# Patient Record
Sex: MALE | Race: WHITE | NOT HISPANIC OR LATINO
[De-identification: names, ages, dates, MRNs, and addresses within clinical notes are randomized per-mention and may not be internally consistent; named-entity substitution may affect disease eponyms.]

---

## 2018-12-21 ENCOUNTER — APPOINTMENT (OUTPATIENT)
Dept: CARDIOLOGY | Facility: CLINIC | Age: 63
End: 2018-12-21
Payer: COMMERCIAL

## 2018-12-21 ENCOUNTER — NON-APPOINTMENT (OUTPATIENT)
Age: 63
End: 2018-12-21

## 2018-12-21 VITALS
HEIGHT: 70 IN | HEART RATE: 83 BPM | SYSTOLIC BLOOD PRESSURE: 145 MMHG | RESPIRATION RATE: 18 BRPM | OXYGEN SATURATION: 95 % | DIASTOLIC BLOOD PRESSURE: 86 MMHG | TEMPERATURE: 97.9 F | WEIGHT: 268 LBS | BODY MASS INDEX: 38.37 KG/M2

## 2018-12-21 DIAGNOSIS — Z83.3 FAMILY HISTORY OF DIABETES MELLITUS: ICD-10-CM

## 2018-12-21 DIAGNOSIS — Z82.49 FAMILY HISTORY OF ISCHEMIC HEART DISEASE AND OTHER DISEASES OF THE CIRCULATORY SYSTEM: ICD-10-CM

## 2018-12-21 DIAGNOSIS — Z86.39 PERSONAL HISTORY OF OTHER ENDOCRINE, NUTRITIONAL AND METABOLIC DISEASE: ICD-10-CM

## 2018-12-21 DIAGNOSIS — Z00.00 ENCOUNTER FOR GENERAL ADULT MEDICAL EXAMINATION W/OUT ABNORMAL FINDINGS: ICD-10-CM

## 2018-12-21 PROCEDURE — 93000 ELECTROCARDIOGRAM COMPLETE: CPT

## 2018-12-21 PROCEDURE — 99244 OFF/OP CNSLTJ NEW/EST MOD 40: CPT

## 2018-12-21 NOTE — REASON FOR VISIT
[Consultation] : a consultation regarding [FreeTextEntry2] : pt with h/o 3/24/14 LAD stents-2, pt had abnl nuclear stres test, no sx, pt had weight gain, gets barkley 2-3 flights of stairs, no [FreeTextEntry1] : no sscp or palps

## 2018-12-21 NOTE — DISCUSSION/SUMMARY
[Exercise Stress Test] : exercise stress test [Hyperlipidemia] : hyperlipidemia [Lipids Test Panel] : a fasting lipid profile [Stress Test Treadmill] : an exercise treadmill test [Known CAD] : known coronary artery disease [Family History of CAD] : family history of CAD [Echocardiogram] : an echocardiogram [de-identified] : h/o lad stents 2014 [de-identified] : nuclear stress test

## 2018-12-21 NOTE — PHYSICAL EXAM
[General Appearance - Well Developed] : well developed [Normal Appearance] : normal appearance [Well Groomed] : well groomed [General Appearance - Well Nourished] : well nourished [No Deformities] : no deformities [General Appearance - In No Acute Distress] : no acute distress [Normal Conjunctiva] : the conjunctiva exhibited no abnormalities [Eyelids - No Xanthelasma] : the eyelids demonstrated no xanthelasmas [Normal Oral Mucosa] : normal oral mucosa [No Oral Pallor] : no oral pallor [No Oral Cyanosis] : no oral cyanosis [Normal Jugular Venous A Waves Present] : normal jugular venous A waves present [Normal Jugular Venous V Waves Present] : normal jugular venous V waves present [No Jugular Venous Chavis A Waves] : no jugular venous chavis A waves [Normal Rate] : normal [Normal S1] : normal S1 [Normal S2] : normal S2 [S3] : no S3 [S4] : no S4 [No Murmur] : no murmurs heard [Right Carotid Bruit] : no bruit heard over the right carotid [Left Carotid Bruit] : no bruit heard over the left carotid [Right Femoral Bruit] : no bruit heard over the right femoral artery [Left Femoral Bruit] : no bruit heard over the left femoral artery [2+] : left 2+ [No Abnormalities] : the abdominal aorta was not enlarged and no bruit was heard [No Pitting Edema] : no pitting edema present [Respiration, Rhythm And Depth] : normal respiratory rhythm and effort [Exaggerated Use Of Accessory Muscles For Inspiration] : no accessory muscle use [Auscultation Breath Sounds / Voice Sounds] : lungs were clear to auscultation bilaterally [Abdomen Soft] : soft [Abdomen Tenderness] : non-tender [Abdomen Mass (___ Cm)] : no abdominal mass palpated [Abnormal Walk] : normal gait [Gait - Sufficient For Exercise Testing] : the gait was sufficient for exercise testing [Nail Clubbing] : no clubbing of the fingernails [Cyanosis, Localized] : no localized cyanosis [Petechial Hemorrhages (___cm)] : no petechial hemorrhages [Skin Color & Pigmentation] : normal skin color and pigmentation [] : no rash [No Venous Stasis] : no venous stasis [Skin Lesions] : no skin lesions [No Skin Ulcers] : no skin ulcer [No Xanthoma] : no  xanthoma was observed [Oriented To Time, Place, And Person] : oriented to person, place, and time [Affect] : the affect was normal [Mood] : the mood was normal [No Anxiety] : not feeling anxious

## 2019-01-18 ENCOUNTER — APPOINTMENT (OUTPATIENT)
Dept: CARDIOLOGY | Facility: CLINIC | Age: 64
End: 2019-01-18
Payer: COMMERCIAL

## 2019-01-18 PROCEDURE — 93306 TTE W/DOPPLER COMPLETE: CPT

## 2019-02-25 ENCOUNTER — APPOINTMENT (OUTPATIENT)
Dept: CARDIOLOGY | Facility: CLINIC | Age: 64
End: 2019-02-25
Payer: COMMERCIAL

## 2019-02-25 PROCEDURE — 93015 CV STRESS TEST SUPVJ I&R: CPT

## 2019-02-25 PROCEDURE — 78452 HT MUSCLE IMAGE SPECT MULT: CPT

## 2019-02-25 PROCEDURE — A9500: CPT

## 2019-07-21 ENCOUNTER — RX RENEWAL (OUTPATIENT)
Age: 64
End: 2019-07-21

## 2020-01-02 ENCOUNTER — RX RENEWAL (OUTPATIENT)
Age: 65
End: 2020-01-02

## 2020-01-22 ENCOUNTER — RX RENEWAL (OUTPATIENT)
Age: 65
End: 2020-01-22

## 2020-04-12 ENCOUNTER — RX RENEWAL (OUTPATIENT)
Age: 65
End: 2020-04-12

## 2020-06-07 ENCOUNTER — RX RENEWAL (OUTPATIENT)
Age: 65
End: 2020-06-07

## 2020-09-04 ENCOUNTER — RX RENEWAL (OUTPATIENT)
Age: 65
End: 2020-09-04

## 2020-09-24 ENCOUNTER — APPOINTMENT (OUTPATIENT)
Dept: CARDIOLOGY | Facility: CLINIC | Age: 65
End: 2020-09-24

## 2020-10-30 ENCOUNTER — APPOINTMENT (OUTPATIENT)
Dept: CARDIOLOGY | Facility: CLINIC | Age: 65
End: 2020-10-30
Payer: MEDICARE

## 2020-10-30 ENCOUNTER — NON-APPOINTMENT (OUTPATIENT)
Age: 65
End: 2020-10-30

## 2020-10-30 ENCOUNTER — TRANSCRIPTION ENCOUNTER (OUTPATIENT)
Age: 65
End: 2020-10-30

## 2020-10-30 VITALS
OXYGEN SATURATION: 99 % | WEIGHT: 260 LBS | HEART RATE: 73 BPM | RESPIRATION RATE: 17 BRPM | SYSTOLIC BLOOD PRESSURE: 125 MMHG | BODY MASS INDEX: 37.22 KG/M2 | TEMPERATURE: 98 F | DIASTOLIC BLOOD PRESSURE: 82 MMHG | HEIGHT: 70 IN

## 2020-10-30 PROCEDURE — 93000 ELECTROCARDIOGRAM COMPLETE: CPT

## 2020-10-30 PROCEDURE — 99215 OFFICE O/P EST HI 40 MIN: CPT

## 2020-10-30 NOTE — DISCUSSION/SUMMARY
[Possible Cardiac Ischemia (Intermd Prob)] : possible cardiac ischemia (intermediate probability) [Hyperlipidemia] : hyperlipidemia [Lipids Test Panel] : a fasting lipid profile [Multidetector Cardiac CT] : a cardiac multidetector CT [Known CAD] : known coronary artery disease [Hypertension] : hypertension [Echocardiogram] : an echocardiogram

## 2020-10-30 NOTE — REASON FOR VISIT
[Follow-Up - Clinic] : a clinic follow-up of [FreeTextEntry2] : pt with occasional dyspnea on incline and chest tightness post stents 2014, no chagne, pt recent on testosterone 2 months ago

## 2020-10-30 NOTE — PHYSICAL EXAM
[General Appearance - Well Developed] : well developed [Normal Appearance] : normal appearance [Well Groomed] : well groomed [General Appearance - Well Nourished] : well nourished [No Deformities] : no deformities [General Appearance - In No Acute Distress] : no acute distress [Normal Conjunctiva] : the conjunctiva exhibited no abnormalities [Eyelids - No Xanthelasma] : the eyelids demonstrated no xanthelasmas [Normal Oral Mucosa] : normal oral mucosa [No Oral Pallor] : no oral pallor [No Oral Cyanosis] : no oral cyanosis [Normal Jugular Venous A Waves Present] : normal jugular venous A waves present [Normal Jugular Venous V Waves Present] : normal jugular venous V waves present [No Jugular Venous Chavis A Waves] : no jugular venous chavis A waves [Respiration, Rhythm And Depth] : normal respiratory rhythm and effort [Exaggerated Use Of Accessory Muscles For Inspiration] : no accessory muscle use [Auscultation Breath Sounds / Voice Sounds] : lungs were clear to auscultation bilaterally [Abdomen Soft] : soft [Abdomen Tenderness] : non-tender [Abdomen Mass (___ Cm)] : no abdominal mass palpated [Abnormal Walk] : normal gait [Gait - Sufficient For Exercise Testing] : the gait was sufficient for exercise testing [Nail Clubbing] : no clubbing of the fingernails [Cyanosis, Localized] : no localized cyanosis [Petechial Hemorrhages (___cm)] : no petechial hemorrhages [Skin Color & Pigmentation] : normal skin color and pigmentation [] : no rash [No Venous Stasis] : no venous stasis [Skin Lesions] : no skin lesions [No Skin Ulcers] : no skin ulcer [No Xanthoma] : no  xanthoma was observed [Oriented To Time, Place, And Person] : oriented to person, place, and time [Affect] : the affect was normal [Mood] : the mood was normal [No Anxiety] : not feeling anxious [Normal Rate] : normal [Normal S1] : normal S1 [Normal S2] : normal S2 [No Murmur] : no murmurs heard [2+] : left 2+ [No Abnormalities] : the abdominal aorta was not enlarged and no bruit was heard [No Pitting Edema] : no pitting edema present [S3] : no S3 [S4] : no S4 [Right Carotid Bruit] : no bruit heard over the right carotid [Left Carotid Bruit] : no bruit heard over the left carotid [Right Femoral Bruit] : no bruit heard over the right femoral artery [Left Femoral Bruit] : no bruit heard over the left femoral artery

## 2020-11-20 ENCOUNTER — APPOINTMENT (OUTPATIENT)
Dept: CARDIOLOGY | Facility: CLINIC | Age: 65
End: 2020-11-20
Payer: MEDICARE

## 2020-11-20 PROCEDURE — 93306 TTE W/DOPPLER COMPLETE: CPT

## 2020-12-02 ENCOUNTER — NON-APPOINTMENT (OUTPATIENT)
Age: 65
End: 2020-12-02

## 2020-12-04 ENCOUNTER — APPOINTMENT (OUTPATIENT)
Dept: CARDIOLOGY | Facility: CLINIC | Age: 65
End: 2020-12-04

## 2020-12-04 ENCOUNTER — RESULT REVIEW (OUTPATIENT)
Age: 65
End: 2020-12-04

## 2020-12-04 ENCOUNTER — RX RENEWAL (OUTPATIENT)
Age: 65
End: 2020-12-04

## 2020-12-04 ENCOUNTER — OUTPATIENT (OUTPATIENT)
Dept: OUTPATIENT SERVICES | Facility: HOSPITAL | Age: 65
LOS: 1 days | End: 2020-12-04
Payer: MEDICARE

## 2020-12-04 DIAGNOSIS — R07.9 CHEST PAIN, UNSPECIFIED: ICD-10-CM

## 2020-12-04 DIAGNOSIS — I25.10 ATHEROSCLEROTIC HEART DISEASE OF NATIVE CORONARY ARTERY WITHOUT ANGINA PECTORIS: ICD-10-CM

## 2020-12-04 DIAGNOSIS — R06.00 DYSPNEA, UNSPECIFIED: ICD-10-CM

## 2020-12-04 PROCEDURE — 75574 CT ANGIO HRT W/3D IMAGE: CPT

## 2020-12-04 PROCEDURE — 75574 CT ANGIO HRT W/3D IMAGE: CPT | Mod: 26

## 2020-12-08 ENCOUNTER — OUTPATIENT (OUTPATIENT)
Dept: OUTPATIENT SERVICES | Facility: HOSPITAL | Age: 65
LOS: 1 days | End: 2020-12-08
Payer: MEDICARE

## 2020-12-08 DIAGNOSIS — Z11.59 ENCOUNTER FOR SCREENING FOR OTHER VIRAL DISEASES: ICD-10-CM

## 2020-12-08 LAB — SARS-COV-2 RNA SPEC QL NAA+PROBE: SIGNIFICANT CHANGE UP

## 2020-12-08 PROCEDURE — U0003: CPT

## 2020-12-11 ENCOUNTER — OUTPATIENT (OUTPATIENT)
Dept: OUTPATIENT SERVICES | Facility: HOSPITAL | Age: 65
LOS: 1 days | End: 2020-12-11
Payer: MEDICARE

## 2020-12-11 VITALS
SYSTOLIC BLOOD PRESSURE: 113 MMHG | RESPIRATION RATE: 16 BRPM | DIASTOLIC BLOOD PRESSURE: 66 MMHG | OXYGEN SATURATION: 98 % | HEART RATE: 65 BPM

## 2020-12-11 VITALS
OXYGEN SATURATION: 96 % | HEART RATE: 80 BPM | HEIGHT: 70 IN | WEIGHT: 259.93 LBS | DIASTOLIC BLOOD PRESSURE: 73 MMHG | TEMPERATURE: 99 F | RESPIRATION RATE: 18 BRPM | SYSTOLIC BLOOD PRESSURE: 126 MMHG

## 2020-12-11 DIAGNOSIS — Z95.5 PRESENCE OF CORONARY ANGIOPLASTY IMPLANT AND GRAFT: Chronic | ICD-10-CM

## 2020-12-11 DIAGNOSIS — Z90.49 ACQUIRED ABSENCE OF OTHER SPECIFIED PARTS OF DIGESTIVE TRACT: Chronic | ICD-10-CM

## 2020-12-11 DIAGNOSIS — I25.10 ATHEROSCLEROTIC HEART DISEASE OF NATIVE CORONARY ARTERY WITHOUT ANGINA PECTORIS: ICD-10-CM

## 2020-12-11 DIAGNOSIS — R06.00 DYSPNEA, UNSPECIFIED: ICD-10-CM

## 2020-12-11 LAB
ANION GAP SERPL CALC-SCNC: 10 MMOL/L — SIGNIFICANT CHANGE UP (ref 5–17)
BUN SERPL-MCNC: 18 MG/DL — SIGNIFICANT CHANGE UP (ref 7–23)
CALCIUM SERPL-MCNC: 9.6 MG/DL — SIGNIFICANT CHANGE UP (ref 8.4–10.5)
CHLORIDE SERPL-SCNC: 100 MMOL/L — SIGNIFICANT CHANGE UP (ref 96–108)
CO2 SERPL-SCNC: 24 MMOL/L — SIGNIFICANT CHANGE UP (ref 22–31)
CREAT SERPL-MCNC: 1.07 MG/DL — SIGNIFICANT CHANGE UP (ref 0.5–1.3)
GLUCOSE SERPL-MCNC: 101 MG/DL — HIGH (ref 70–99)
HCT VFR BLD CALC: 45.2 % — SIGNIFICANT CHANGE UP (ref 39–50)
HGB BLD-MCNC: 14.9 G/DL — SIGNIFICANT CHANGE UP (ref 13–17)
MCHC RBC-ENTMCNC: 30 PG — SIGNIFICANT CHANGE UP (ref 27–34)
MCHC RBC-ENTMCNC: 33 GM/DL — SIGNIFICANT CHANGE UP (ref 32–36)
MCV RBC AUTO: 90.9 FL — SIGNIFICANT CHANGE UP (ref 80–100)
NRBC # BLD: 0 /100 WBCS — SIGNIFICANT CHANGE UP (ref 0–0)
PLATELET # BLD AUTO: 204 K/UL — SIGNIFICANT CHANGE UP (ref 150–400)
POTASSIUM SERPL-MCNC: 4.1 MMOL/L — SIGNIFICANT CHANGE UP (ref 3.5–5.3)
POTASSIUM SERPL-SCNC: 4.1 MMOL/L — SIGNIFICANT CHANGE UP (ref 3.5–5.3)
RBC # BLD: 4.97 M/UL — SIGNIFICANT CHANGE UP (ref 4.2–5.8)
RBC # FLD: 12.9 % — SIGNIFICANT CHANGE UP (ref 10.3–14.5)
SODIUM SERPL-SCNC: 134 MMOL/L — LOW (ref 135–145)
WBC # BLD: 6.28 K/UL — SIGNIFICANT CHANGE UP (ref 3.8–10.5)
WBC # FLD AUTO: 6.28 K/UL — SIGNIFICANT CHANGE UP (ref 3.8–10.5)

## 2020-12-11 PROCEDURE — 93005 ELECTROCARDIOGRAM TRACING: CPT

## 2020-12-11 PROCEDURE — 93454 CORONARY ARTERY ANGIO S&I: CPT | Mod: 26

## 2020-12-11 PROCEDURE — 93454 CORONARY ARTERY ANGIO S&I: CPT

## 2020-12-11 PROCEDURE — 85027 COMPLETE CBC AUTOMATED: CPT

## 2020-12-11 PROCEDURE — 99152 MOD SED SAME PHYS/QHP 5/>YRS: CPT

## 2020-12-11 PROCEDURE — 80048 BASIC METABOLIC PNL TOTAL CA: CPT

## 2020-12-11 PROCEDURE — C1769: CPT

## 2020-12-11 PROCEDURE — C1894: CPT

## 2020-12-11 PROCEDURE — 93010 ELECTROCARDIOGRAM REPORT: CPT

## 2020-12-11 PROCEDURE — C1887: CPT

## 2020-12-11 RX ORDER — ATORVASTATIN CALCIUM 80 MG/1
1 TABLET, FILM COATED ORAL
Qty: 0 | Refills: 0 | DISCHARGE

## 2020-12-11 RX ORDER — ASPIRIN/CALCIUM CARB/MAGNESIUM 324 MG
1 TABLET ORAL
Qty: 0 | Refills: 0 | DISCHARGE

## 2020-12-11 RX ORDER — TAMSULOSIN HYDROCHLORIDE 0.4 MG/1
1 CAPSULE ORAL
Qty: 0 | Refills: 0 | DISCHARGE

## 2020-12-11 RX ORDER — METOPROLOL TARTRATE 50 MG
1 TABLET ORAL
Qty: 0 | Refills: 0 | DISCHARGE

## 2020-12-11 RX ORDER — CLOPIDOGREL BISULFATE 75 MG/1
1 TABLET, FILM COATED ORAL
Qty: 0 | Refills: 0 | DISCHARGE

## 2020-12-11 NOTE — H&P CARDIOLOGY - PMH
Benign prostatic hyperplasia, unspecified whether lower urinary tract symptoms present    Coronary artery disease involving native coronary artery of native heart with unstable angina pectoris    Essential hypertension    Hyperlipidemia, unspecified hyperlipidemia type

## 2020-12-11 NOTE — ASU DISCHARGE PLAN (ADULT/PEDIATRIC) - CALL YOUR DOCTOR IF YOU HAVE ANY OF THE FOLLOWING:
Fever greater than (need to indicate Fahrenheit or Celsius)/Swelling that gets worse/Wound/Surgical Site with redness, or foul smelling discharge or pus/Bleeding that does not stop/Pain not relieved by Medications

## 2020-12-11 NOTE — ASU DISCHARGE PLAN (ADULT/PEDIATRIC) - CARE PROVIDER_API CALL
Natalio Molina  [facility] CARDIOLOGY  [address], [address]  [place] [address]  Phone: [phone]  Fax: [phone]  Established Patient  Follow Up Time: 2 weeks

## 2020-12-11 NOTE — ASU DISCHARGE PLAN (ADULT/PEDIATRIC) - ASU DC SPECIAL INSTRUCTIONSFT
Wound Care:   the day AFTER your procedure remove bandage GENTLY, and clean using  mild soap and gentle warm, water stream, pat dry. leave OPEN to air. YOU MAY SHOWER   DO NOT apply lotions, creams, ointments, powder, perfumes to your incision site  DO NOT SOAK your site for 1 week ( no baths, no pools, no tubs, etc...)  Check  your groin and /or wrist daily. A small amount of bruising, and soreness are normal    ACTIVITY: for 24 hours   - DO NOT DRIVE  - DO NOT make any important decisions or sign legal documents   - DO NOT operate heavy machineries   - you may resume sexual activity in 48 hours, unless otherwise instructed by your cardiologist     Your procedure was done through the WRIST: for the NEXT 3DAYS:  - avoid pushing, pulling, with that affected wrist   - avoid repeated movement of that hand and wrist ( eg: typing, hammering)  - DO NOT LIFT anything more than 5 lbs     MEDICATION:   take your medications as explained ( see discharge paperwork)       Follow heart healthy diet recommended by your doctor, if you smoke STOP SMOKING ( may call [phone] for center of tobacco control if you need assistance)     CALL your doctor to make appointment in 2 WEEKS     ***CALL YOUR DOCTOR***  if you experience: fever, chills, body aches, or severe pain, swelling, redness, heat or yellow discharge at incision site  If you experience bleeding or excruciating pain at the procedural site, swelling ( golf ball size) at your procedural site  If you experience CHEST PAIN  If you experience extremity numbness, tingling, temperature change ( of your procedural site)   If you are unable to reach your doctor, you may contact:   -Cardiology Office at [facility] at [phone] or   - [phone]

## 2020-12-11 NOTE — H&P CARDIOLOGY - HISTORY OF PRESENT ILLNESS
64 yo M with PMhx HTN, HLD, CAD with stents x 3 in 2016 , BPH presents today for  left cardiac catheterization because of unstable angina. Patient states he can bike a few miles or walk 10 blocks then he has FLORES and a dull mild chest pain . Symptoms resolve on their own, no alleviating factors. No palpitations, syncope, diaphoresis .  Patient seen by Dr Molina and was reffred for cath .   No fever,  chills, N/V/D or abdominal pain . COVID negative 12/8/20.  64 yo M with PMhx HTN, HLD, CAD with stents x 3 in 2016 , BPH presents today for  left cardiac catheterization because of unstable angina. Patient states he can bike a few miles or walk 10 blocks then he has FLORES and a dull mild chest pain . Symptoms resolve on their own, no alleviating factors. No palpitations, syncope, diaphoresis .  He had a CTA coronaries that revealed :  -- LM: contains luminal irregularities but appear patent.  --LAD: contains non-calcified plaque at the ostium with mild (less than 50%) luminal stenosis.  There is mixed plaque in the proximal LAD with likely mild (less than 50%) luminal stenosis.  A stent is noted in the proximal to mid portion of the LAD; metal artifact from the stent limits assessment of stent patency.   Cannot exclude stenosis in the stent.  There is non-calcified plaque in the mid LAD just distal to the stent with likely moderate (approximately 60-69%) luminal stenosis.  The visualized first diagonal branch contains multiple regions of mixed plaque in the proximal and mid segments with severe (>90%) luminal stenosis vs. total occlusion.  --LCX: contains mixed plaque at the ostium with likely moderate (50-69%) luminal stenosis.  The proximal and distal LCX contain regions of mixed plaque with minimal (less than 30%) luminal stenosis.  --RCA: contains a stent in the proximal segment.  Stent-associated metal artifact limits assessment of patency.  There is mixed plaque in the mid RCA with moderate (approximately 60-69%) luminal stenosis.   The distal RCA contains mixed plaque with likely mild (approximately 30%) luminal stenosis.      Patient seen by Dr Molina and was referred for cath .   No fever,  chills, N/V/D or abdominal pain . COVID negative 12/8/20.

## 2020-12-16 PROBLEM — I25.110 ATHEROSCLEROTIC HEART DISEASE OF NATIVE CORONARY ARTERY WITH UNSTABLE ANGINA PECTORIS: Chronic | Status: ACTIVE | Noted: 2020-12-11

## 2020-12-16 PROBLEM — I10 ESSENTIAL (PRIMARY) HYPERTENSION: Chronic | Status: ACTIVE | Noted: 2020-12-11

## 2020-12-16 PROBLEM — N40.0 BENIGN PROSTATIC HYPERPLASIA WITHOUT LOWER URINARY TRACT SYMPTOMS: Chronic | Status: ACTIVE | Noted: 2020-12-11

## 2020-12-16 PROBLEM — E78.5 HYPERLIPIDEMIA, UNSPECIFIED: Chronic | Status: ACTIVE | Noted: 2020-12-11

## 2021-01-03 ENCOUNTER — RX RENEWAL (OUTPATIENT)
Age: 66
End: 2021-01-03

## 2021-01-15 ENCOUNTER — NON-APPOINTMENT (OUTPATIENT)
Age: 66
End: 2021-01-15

## 2021-01-15 ENCOUNTER — APPOINTMENT (OUTPATIENT)
Dept: CARDIOLOGY | Facility: CLINIC | Age: 66
End: 2021-01-15
Payer: MEDICARE

## 2021-01-15 VITALS
DIASTOLIC BLOOD PRESSURE: 85 MMHG | HEIGHT: 70 IN | OXYGEN SATURATION: 97 % | SYSTOLIC BLOOD PRESSURE: 129 MMHG | WEIGHT: 267 LBS | RESPIRATION RATE: 17 BRPM | HEART RATE: 86 BPM | TEMPERATURE: 98.2 F | BODY MASS INDEX: 38.22 KG/M2

## 2021-01-15 PROCEDURE — 93000 ELECTROCARDIOGRAM COMPLETE: CPT

## 2021-01-15 PROCEDURE — 99215 OFFICE O/P EST HI 40 MIN: CPT

## 2021-01-15 NOTE — DISCUSSION/SUMMARY
[Lipids Test Panel] : a fasting lipid profile [Hyperlipidemia] : hyperlipidemia [Known CAD] : known coronary artery disease [Hypertension] : hypertension [Improving] : improving [Stable] : stable [None] : none [Medication Changes Per Orders] : as documented in orders [<70] : goal is <70 [At Goal] : The LDL is not at goal [de-identified] : inc lipitor to 40 alt with 60

## 2021-01-15 NOTE — REASON FOR VISIT
[Follow-Up - Clinic] : a clinic follow-up of [FreeTextEntry2] : pt with occasional dyspnea on incline and chest tightness post stents 2014, no chagne, pt recent on testosterone 2 months ago, now s/p cath,cta, med rx

## 2021-01-15 NOTE — PHYSICAL EXAM
[General Appearance - Well Developed] : well developed [Normal Appearance] : normal appearance [Well Groomed] : well groomed [General Appearance - Well Nourished] : well nourished [No Deformities] : no deformities [General Appearance - In No Acute Distress] : no acute distress [Normal Conjunctiva] : the conjunctiva exhibited no abnormalities [Eyelids - No Xanthelasma] : the eyelids demonstrated no xanthelasmas [Normal Oral Mucosa] : normal oral mucosa [No Oral Pallor] : no oral pallor [No Oral Cyanosis] : no oral cyanosis [Normal Jugular Venous A Waves Present] : normal jugular venous A waves present [Normal Jugular Venous V Waves Present] : normal jugular venous V waves present [No Jugular Venous Chavis A Waves] : no jugular venous chavis A waves [Abnormal Walk] : normal gait [Gait - Sufficient For Exercise Testing] : the gait was sufficient for exercise testing [Nail Clubbing] : no clubbing of the fingernails [Cyanosis, Localized] : no localized cyanosis [Petechial Hemorrhages (___cm)] : no petechial hemorrhages [Skin Color & Pigmentation] : normal skin color and pigmentation [] : no rash [No Venous Stasis] : no venous stasis [Skin Lesions] : no skin lesions [No Skin Ulcers] : no skin ulcer [No Xanthoma] : no  xanthoma was observed [Oriented To Time, Place, And Person] : oriented to person, place, and time [Affect] : the affect was normal [Mood] : the mood was normal [No Anxiety] : not feeling anxious

## 2021-03-03 ENCOUNTER — RX RENEWAL (OUTPATIENT)
Age: 66
End: 2021-03-03

## 2021-06-27 ENCOUNTER — RX RENEWAL (OUTPATIENT)
Age: 66
End: 2021-06-27

## 2021-07-14 ENCOUNTER — APPOINTMENT (OUTPATIENT)
Dept: CARDIOLOGY | Facility: CLINIC | Age: 66
End: 2021-07-14
Payer: MEDICARE

## 2021-07-14 ENCOUNTER — NON-APPOINTMENT (OUTPATIENT)
Age: 66
End: 2021-07-14

## 2021-07-14 VITALS
WEIGHT: 270 LBS | RESPIRATION RATE: 16 BRPM | TEMPERATURE: 98 F | HEART RATE: 70 BPM | HEIGHT: 70 IN | OXYGEN SATURATION: 98 % | BODY MASS INDEX: 38.65 KG/M2 | SYSTOLIC BLOOD PRESSURE: 124 MMHG | DIASTOLIC BLOOD PRESSURE: 83 MMHG

## 2021-07-14 PROCEDURE — 99214 OFFICE O/P EST MOD 30 MIN: CPT

## 2021-07-14 PROCEDURE — 93000 ELECTROCARDIOGRAM COMPLETE: CPT

## 2021-07-14 RX ORDER — TAMSULOSIN HCL 0.4 MG
0.4 CAPSULE ORAL
Refills: 0 | Status: ACTIVE | COMMUNITY

## 2021-07-19 NOTE — DISCUSSION/SUMMARY
[Improving] : improving [Stable] : stable [Hyperlipidemia] : hyperlipidemia [Lipids Test Panel] : a fasting lipid profile [Known CAD] : known coronary artery disease [Hypertension] : hypertension [<70] : goal is <70 [At Goal] : The LDL is not at goal

## 2021-07-19 NOTE — PHYSICAL EXAM
[General Appearance - Well Developed] : well developed [Normal Appearance] : normal appearance [Well Groomed] : well groomed [General Appearance - Well Nourished] : well nourished [No Deformities] : no deformities [General Appearance - In No Acute Distress] : no acute distress [Normal Conjunctiva] : the conjunctiva exhibited no abnormalities [Eyelids - No Xanthelasma] : the eyelids demonstrated no xanthelasmas [Normal Oral Mucosa] : normal oral mucosa [No Oral Pallor] : no oral pallor [No Oral Cyanosis] : no oral cyanosis [Normal Jugular Venous A Waves Present] : normal jugular venous A waves present [Normal Jugular Venous V Waves Present] : normal jugular venous V waves present [No Jugular Venous Chavis A Waves] : no jugular venous chavis A waves [Gait - Sufficient For Exercise Testing] : the gait was sufficient for exercise testing [Abnormal Walk] : normal gait [Nail Clubbing] : no clubbing of the fingernails [Cyanosis, Localized] : no localized cyanosis [Petechial Hemorrhages (___cm)] : no petechial hemorrhages [Skin Color & Pigmentation] : normal skin color and pigmentation [] : no rash [No Venous Stasis] : no venous stasis [No Skin Ulcers] : no skin ulcer [Skin Lesions] : no skin lesions [No Xanthoma] : no  xanthoma was observed [Oriented To Time, Place, And Person] : oriented to person, place, and time [Affect] : the affect was normal [Mood] : the mood was normal [No Anxiety] : not feeling anxious

## 2021-07-19 NOTE — REASON FOR VISIT
Maria Del Carmen Verdugo  Preferred Name:   None  Female, 34 year old, 1984  Phone:   [phone] (Home Phone) [phone] (Mobile) [phone] (Work Phone)  Last Weight:   97 kg  PCP:   Adal Cloud MD  Allergies:   Tizanidine  ,   Tramadol  Primary Ins:   COMMON G  MRN:   [id number]  Patient Portal:   Active  Last Logon:   04/11/19  Next Appt:   With Adal Cloud MD  05/21/2019 at 1:40 PM  Last Appt With Me:   RE: REQUESTING ASSIST   Received: Today   Message Contents   MD Roslyn Black             Sure KK    Previous Messages      ----- Message -----   From: Roslyn Priest   Sent: 4/15/2019   1:12 PM   To: Ashlee Mcgill MD, Michelle Cai, *   Subject: REQUESTING ASSIST                                 Hi Dr. Mcgill, will you be able to assist ?     Doctor: Dr. Betina Person   Date: 05.31.2019   Time: 7:30 AM     Procedure:  Lap total hyst, uterus < 250 gm [21712] and bilateral salpingectomy, cystoscopy     Procedure Length: 3 hours     Location: [facility] [place]     Thank you for your response, Bety MARSH (Covering Ashlee GARCIA           [Follow-Up - Clinic] : a clinic follow-up of [FreeTextEntry2] : pt with occasional dyspnea on incline and chest tightness post stents 2014, no chagne, pt recent on testosterone 2 months ago, now s/p cath,cta, med rx

## 2021-08-24 DIAGNOSIS — Z01.812 ENCOUNTER FOR PREPROCEDURAL LABORATORY EXAMINATION: ICD-10-CM

## 2021-09-14 LAB — SARS-COV-2 N GENE NPH QL NAA+PROBE: NOT DETECTED

## 2021-09-17 ENCOUNTER — APPOINTMENT (OUTPATIENT)
Age: 66
End: 2021-09-17
Payer: MEDICARE

## 2021-09-17 VITALS
DIASTOLIC BLOOD PRESSURE: 90 MMHG | RESPIRATION RATE: 16 BRPM | OXYGEN SATURATION: 97 % | BODY MASS INDEX: 38.65 KG/M2 | WEIGHT: 270 LBS | HEIGHT: 70 IN | HEART RATE: 95 BPM | SYSTOLIC BLOOD PRESSURE: 123 MMHG | TEMPERATURE: 97.9 F

## 2021-09-17 DIAGNOSIS — Z95.5 PRESENCE OF CORONARY ANGIOPLASTY IMPLANT AND GRAFT: ICD-10-CM

## 2021-09-17 DIAGNOSIS — R06.83 SNORING: ICD-10-CM

## 2021-09-17 PROCEDURE — 99203 OFFICE O/P NEW LOW 30 MIN: CPT | Mod: 25

## 2021-09-17 PROCEDURE — ZZZZZ: CPT

## 2021-09-17 PROCEDURE — 94727 GAS DIL/WSHOT DETER LNG VOL: CPT

## 2021-09-17 PROCEDURE — 94010 BREATHING CAPACITY TEST: CPT

## 2021-09-17 PROCEDURE — 94729 DIFFUSING CAPACITY: CPT

## 2021-09-17 NOTE — HISTORY OF PRESENT ILLNESS
[Never] : never [TextBox_4] : Patient is a 65 year old male Hx CAD s/p stent placement x 2, pituitary tumor, JONO not using CPAP, presents to [facility] for evaluation.  carlie had last sleep study 5 years ago.  He has not used CPAP in four years.  He recently underwent stent placement and Cardiologist recommend revaluating sleep apnea.  He admits to snoring, unrefreshed upon awakening.  He is here for these symptoms.

## 2021-09-17 NOTE — ASSESSMENT
[FreeTextEntry1] : 65 year old male Hx CAD s/p stent placement, Hx JONO not using CPAP presents for evaluation snoring\par \par Recommend Home Sleep Study\par \par Follow up pending study results.

## 2021-09-17 NOTE — PROCEDURE
[FreeTextEntry1] : PFT 9/17/21 personally reviewed minimal obstructive ventilatory defect without gas exchange abnormality

## 2021-10-08 ENCOUNTER — RX RENEWAL (OUTPATIENT)
Age: 66
End: 2021-10-08

## 2021-11-01 ENCOUNTER — APPOINTMENT (OUTPATIENT)
Dept: PULMONOLOGY | Facility: CLINIC | Age: 66
End: 2021-11-01
Payer: MEDICARE

## 2021-11-01 PROCEDURE — 99441: CPT | Mod: 95

## 2021-11-08 ENCOUNTER — TRANSCRIPTION ENCOUNTER (OUTPATIENT)
Age: 66
End: 2021-11-08

## 2021-11-29 ENCOUNTER — APPOINTMENT (OUTPATIENT)
Dept: CARDIOLOGY | Facility: CLINIC | Age: 66
End: 2021-11-29
Payer: MEDICARE

## 2021-11-29 ENCOUNTER — NON-APPOINTMENT (OUTPATIENT)
Age: 66
End: 2021-11-29

## 2021-11-29 VITALS
BODY MASS INDEX: 38.37 KG/M2 | SYSTOLIC BLOOD PRESSURE: 131 MMHG | DIASTOLIC BLOOD PRESSURE: 86 MMHG | HEIGHT: 70 IN | RESPIRATION RATE: 20 BRPM | OXYGEN SATURATION: 97 % | WEIGHT: 268 LBS | TEMPERATURE: 97.6 F | HEART RATE: 68 BPM

## 2021-11-29 PROCEDURE — 99214 OFFICE O/P EST MOD 30 MIN: CPT

## 2021-11-29 PROCEDURE — 93000 ELECTROCARDIOGRAM COMPLETE: CPT

## 2021-11-29 PROCEDURE — 93306 TTE W/DOPPLER COMPLETE: CPT

## 2021-11-29 NOTE — REASON FOR VISIT
[Follow-Up - Clinic] : a clinic follow-up of [FreeTextEntry2] : pt with occasional dyspnea on incline  post stents 2014, no change, pt recent on testosterone, now s/p cath,cta, med rx [FreeTextEntry1] : now feels 1-2 months of anxious, jittery, feels heart beating, no sscp, sx better with xanax

## 2022-01-05 ENCOUNTER — APPOINTMENT (OUTPATIENT)
Dept: CARDIOLOGY | Facility: CLINIC | Age: 67
End: 2022-01-05

## 2022-04-13 ENCOUNTER — TRANSCRIPTION ENCOUNTER (OUTPATIENT)
Age: 67
End: 2022-04-13

## 2022-04-19 ENCOUNTER — TRANSCRIPTION ENCOUNTER (OUTPATIENT)
Age: 67
End: 2022-04-19

## 2022-05-18 ENCOUNTER — APPOINTMENT (OUTPATIENT)
Dept: CARDIOLOGY | Facility: CLINIC | Age: 67
End: 2022-05-18

## 2022-06-06 ENCOUNTER — RX RENEWAL (OUTPATIENT)
Age: 67
End: 2022-06-06

## 2022-07-13 ENCOUNTER — RX RENEWAL (OUTPATIENT)
Age: 67
End: 2022-07-13

## 2022-09-06 ENCOUNTER — RX RENEWAL (OUTPATIENT)
Age: 67
End: 2022-09-06

## 2022-09-22 ENCOUNTER — NON-APPOINTMENT (OUTPATIENT)
Age: 67
End: 2022-09-22

## 2022-09-22 ENCOUNTER — APPOINTMENT (OUTPATIENT)
Dept: CARDIOLOGY | Facility: CLINIC | Age: 67
End: 2022-09-22

## 2022-09-22 VITALS
HEART RATE: 77 BPM | WEIGHT: 260 LBS | BODY MASS INDEX: 37.22 KG/M2 | TEMPERATURE: 97.6 F | HEIGHT: 70 IN | OXYGEN SATURATION: 99 % | RESPIRATION RATE: 20 BRPM | SYSTOLIC BLOOD PRESSURE: 128 MMHG | DIASTOLIC BLOOD PRESSURE: 85 MMHG

## 2022-09-22 DIAGNOSIS — I25.10 ATHEROSCLEROTIC HEART DISEASE OF NATIVE CORONARY ARTERY W/OUT ANGINA PECTORIS: ICD-10-CM

## 2022-09-22 PROCEDURE — 99215 OFFICE O/P EST HI 40 MIN: CPT

## 2022-09-22 PROCEDURE — 93000 ELECTROCARDIOGRAM COMPLETE: CPT

## 2022-09-22 NOTE — DISCUSSION/SUMMARY
[Improving] : improving [Stable] : stable [Echocardiogram] : echocardiogram [Hyperlipidemia] : hyperlipidemia [Lipids Test Panel] : a fasting lipid profile [Known CAD] : known coronary artery disease [Hypertension] : hypertension [<70] : goal is <70 [At Goal] : The LDL is not at goal [Minutes Spent: ___] : for [unfilled] ~Uminutes [FreeTextEntry2] : last seen 1 year ago, reviewd prior cath,meds,labs

## 2022-09-22 NOTE — REASON FOR VISIT
[Follow-Up - Clinic] : a clinic follow-up of [FreeTextEntry2] : pt with occasional dyspnea on incline  post stents 2014, no change, pt recent on testosterone, now s/p cath,cta, med rx [FreeTextEntry1] : h/o of anxious, jittery, feels heart beating, no sscp, sx better with xanax

## 2022-10-11 ENCOUNTER — RX RENEWAL (OUTPATIENT)
Age: 67
End: 2022-10-11

## 2022-10-21 ENCOUNTER — APPOINTMENT (OUTPATIENT)
Dept: PULMONOLOGY | Facility: CLINIC | Age: 67
End: 2022-10-21

## 2022-10-21 VITALS
BODY MASS INDEX: 38.08 KG/M2 | OXYGEN SATURATION: 97 % | SYSTOLIC BLOOD PRESSURE: 124 MMHG | RESPIRATION RATE: 16 BRPM | WEIGHT: 266 LBS | TEMPERATURE: 97.9 F | HEIGHT: 70 IN | DIASTOLIC BLOOD PRESSURE: 68 MMHG | HEART RATE: 76 BPM

## 2022-10-21 DIAGNOSIS — G47.33 OBSTRUCTIVE SLEEP APNEA (ADULT) (PEDIATRIC): ICD-10-CM

## 2022-10-21 PROCEDURE — 99213 OFFICE O/P EST LOW 20 MIN: CPT | Mod: 25

## 2022-10-21 NOTE — ASSESSMENT
[FreeTextEntry1] : 65 year old male Hx CAD s/p stent placement, Hx JONO, dental device for JONO presents for follow up.\par \par Patient with Hx CAD and JONO and risk cardiovascular events significantly increased in untreated obstructive sleep apnea.  Patient benefits from treatment of JONO, did not tolerate CPAP,  and recommend replacement device. \par \par

## 2022-10-21 NOTE — HISTORY OF PRESENT ILLNESS
[Never] : never [TextBox_4] : Patient is a 65 year old male Hx CAD s/p stent placement x 2, pituitary tumor, JONO not using CPAP, presents to [facility] for follow up.  Patient doing well with dental device for JONO.  He lost device while on vacation in [place].  He is here for follow up JONO and for letter of necessity regarding dental device.

## 2022-12-04 ENCOUNTER — RX RENEWAL (OUTPATIENT)
Age: 67
End: 2022-12-04

## 2023-01-08 ENCOUNTER — RX RENEWAL (OUTPATIENT)
Age: 68
End: 2023-01-08

## 2023-03-10 ENCOUNTER — NON-APPOINTMENT (OUTPATIENT)
Age: 68
End: 2023-03-10

## 2023-03-10 ENCOUNTER — OUTPATIENT (OUTPATIENT)
Dept: OUTPATIENT SERVICES | Facility: HOSPITAL | Age: 68
LOS: 1 days | End: 2023-03-10
Payer: MEDICARE

## 2023-03-10 ENCOUNTER — APPOINTMENT (OUTPATIENT)
Dept: SLEEP CENTER | Facility: CLINIC | Age: 68
End: 2023-03-10
Payer: MEDICARE

## 2023-03-10 DIAGNOSIS — Z90.49 ACQUIRED ABSENCE OF OTHER SPECIFIED PARTS OF DIGESTIVE TRACT: Chronic | ICD-10-CM

## 2023-03-10 DIAGNOSIS — Z95.5 PRESENCE OF CORONARY ANGIOPLASTY IMPLANT AND GRAFT: Chronic | ICD-10-CM

## 2023-03-10 PROCEDURE — G0400: CPT

## 2023-03-10 PROCEDURE — 95800 SLP STDY UNATTENDED: CPT

## 2023-03-16 ENCOUNTER — APPOINTMENT (OUTPATIENT)
Dept: CARDIOLOGY | Facility: CLINIC | Age: 68
End: 2023-03-16
Payer: MEDICARE

## 2023-03-16 ENCOUNTER — NON-APPOINTMENT (OUTPATIENT)
Age: 68
End: 2023-03-16

## 2023-03-16 VITALS — DIASTOLIC BLOOD PRESSURE: 85 MMHG | SYSTOLIC BLOOD PRESSURE: 130 MMHG

## 2023-03-16 VITALS
WEIGHT: 267 LBS | RESPIRATION RATE: 19 BRPM | BODY MASS INDEX: 38.22 KG/M2 | HEIGHT: 70 IN | DIASTOLIC BLOOD PRESSURE: 89 MMHG | HEART RATE: 62 BPM | TEMPERATURE: 95.6 F | SYSTOLIC BLOOD PRESSURE: 134 MMHG | OXYGEN SATURATION: 98 %

## 2023-03-16 PROCEDURE — 93000 ELECTROCARDIOGRAM COMPLETE: CPT

## 2023-03-16 PROCEDURE — 93306 TTE W/DOPPLER COMPLETE: CPT

## 2023-03-16 PROCEDURE — 99214 OFFICE O/P EST MOD 30 MIN: CPT

## 2023-03-16 NOTE — DISCUSSION/SUMMARY
[Improving] : improving [Stable] : stable [Echocardiogram] : echocardiogram [Hyperlipidemia] : hyperlipidemia [Lipids Test Panel] : a fasting lipid profile [Known CAD] : known coronary artery disease [Hypertension] : hypertension [<70] : goal is <70 [Minutes Spent: ___] : for [unfilled] ~Uminutes [At Goal] : The LDL is not at goal

## 2023-03-26 ENCOUNTER — RX RENEWAL (OUTPATIENT)
Age: 68
End: 2023-03-26

## 2023-04-06 DIAGNOSIS — G47.33 OBSTRUCTIVE SLEEP APNEA (ADULT) (PEDIATRIC): ICD-10-CM

## 2023-04-09 ENCOUNTER — RX RENEWAL (OUTPATIENT)
Age: 68
End: 2023-04-09

## 2023-06-19 ENCOUNTER — RX RENEWAL (OUTPATIENT)
Age: 68
End: 2023-06-19

## 2023-06-19 RX ORDER — METOPROLOL SUCCINATE 50 MG/1
50 TABLET, EXTENDED RELEASE ORAL
Qty: 90 | Refills: 0 | Status: ACTIVE | COMMUNITY
Start: 2020-01-02 | End: 1900-01-01

## 2023-07-02 ENCOUNTER — RX RENEWAL (OUTPATIENT)
Age: 68
End: 2023-07-02

## 2023-07-05 ENCOUNTER — RX RENEWAL (OUTPATIENT)
Age: 68
End: 2023-07-05

## 2023-07-05 RX ORDER — CLOPIDOGREL BISULFATE 75 MG/1
75 TABLET, FILM COATED ORAL
Qty: 90 | Refills: 0 | Status: ACTIVE | COMMUNITY
Start: 2019-07-21 | End: 1900-01-01

## 2023-07-31 ENCOUNTER — RX RENEWAL (OUTPATIENT)
Age: 68
End: 2023-07-31

## 2023-09-13 ENCOUNTER — APPOINTMENT (OUTPATIENT)
Dept: CARDIOLOGY | Facility: CLINIC | Age: 68
End: 2023-09-13

## 2024-01-24 ENCOUNTER — APPOINTMENT (OUTPATIENT)
Dept: CARDIOLOGY | Facility: CLINIC | Age: 69
End: 2024-01-24
Payer: MEDICARE

## 2024-01-24 VITALS
HEART RATE: 59 BPM | SYSTOLIC BLOOD PRESSURE: 124 MMHG | WEIGHT: 267 LBS | OXYGEN SATURATION: 98 % | HEIGHT: 70 IN | DIASTOLIC BLOOD PRESSURE: 72 MMHG | BODY MASS INDEX: 38.22 KG/M2

## 2024-01-24 PROCEDURE — 99215 OFFICE O/P EST HI 40 MIN: CPT

## 2024-01-24 PROCEDURE — 93000 ELECTROCARDIOGRAM COMPLETE: CPT

## 2024-01-24 RX ORDER — ATORVASTATIN CALCIUM 80 MG/1
80 TABLET, FILM COATED ORAL
Qty: 90 | Refills: 2 | Status: ACTIVE | COMMUNITY
Start: 2019-07-21 | End: 1900-01-01

## 2024-01-24 NOTE — REASON FOR VISIT
[Follow-Up - Clinic] : a clinic follow-up of [FreeTextEntry2] : pt with occasional dyspnea on incline  post stents 2014, no change, pt recent on testosterone, now s/p cath,cta, med rx, now for cardiac eval, lipid review, no new sscp or sob, has some fatigue [FreeTextEntry1] : h/o of anxious, jittery, feels heart beating, no sscp, sx better with xanax

## 2024-01-24 NOTE — DISCUSSION/SUMMARY
[Improving] : improving [Stable] : stable [Echocardiogram] : echocardiogram [Hyperlipidemia] : hyperlipidemia [Lipids Test Panel] : a fasting lipid profile [Known CAD] : known coronary artery disease [Hypertension] : hypertension [<70] : goal is <70 [Minutes Spent: ___] : for [unfilled] ~Uminutes [Not Responding to Treatment] : not responding to treatment [Medication Changes Per Orders] : Medication changes are as documented in orders [Diet Modification] : diet modification [Exercise] : exercise [Weight Loss] : weight loss [At Goal] : The LDL is not at goal [de-identified] : f/u cad eval [de-identified] : inc lipitor to 80 daily [FreeTextEntry2] : reviewed outside labs

## 2024-01-25 ENCOUNTER — APPOINTMENT (OUTPATIENT)
Dept: CARDIOLOGY | Facility: CLINIC | Age: 69
End: 2024-01-25

## 2024-07-24 ENCOUNTER — NON-APPOINTMENT (OUTPATIENT)
Age: 69
End: 2024-07-24

## 2024-07-24 ENCOUNTER — APPOINTMENT (OUTPATIENT)
Dept: CARDIOLOGY | Facility: CLINIC | Age: 69
End: 2024-07-24
Payer: MEDICARE

## 2024-07-24 VITALS
OXYGEN SATURATION: 97 % | SYSTOLIC BLOOD PRESSURE: 118 MMHG | HEIGHT: 70 IN | BODY MASS INDEX: 37.51 KG/M2 | WEIGHT: 262 LBS | DIASTOLIC BLOOD PRESSURE: 78 MMHG | HEART RATE: 67 BPM

## 2024-07-24 DIAGNOSIS — R06.09 OTHER FORMS OF DYSPNEA: ICD-10-CM

## 2024-07-24 DIAGNOSIS — I25.10 ATHEROSCLEROTIC HEART DISEASE OF NATIVE CORONARY ARTERY W/OUT ANGINA PECTORIS: ICD-10-CM

## 2024-07-24 DIAGNOSIS — Z86.39 PERSONAL HISTORY OF OTHER ENDOCRINE, NUTRITIONAL AND METABOLIC DISEASE: ICD-10-CM

## 2024-07-24 PROCEDURE — 99214 OFFICE O/P EST MOD 30 MIN: CPT

## 2024-07-24 PROCEDURE — 93000 ELECTROCARDIOGRAM COMPLETE: CPT

## 2024-07-24 NOTE — DISCUSSION/SUMMARY
[Improving] : improving [Echocardiogram] : echocardiogram [Hyperlipidemia] : hyperlipidemia [Stable] : stable [Lipids Test Panel] : a fasting lipid profile [Known CAD] : known coronary artery disease [Hypertension] : hypertension [None] : There are no changes in medication management [Diet Modification] : diet modification [Exercise] : exercise [Weight Loss] : weight loss [<70] : goal is <70 [At Goal] : The LDL is not at goal [de-identified] : f/u cad eval [de-identified] : inc lipitor to 80 daily

## 2024-07-24 NOTE — REASON FOR VISIT
[Follow-Up - Clinic] : a clinic follow-up of [FreeTextEntry2] : pt with occasional dyspnea on incline  post stents 2014, no change, pt recent on testosterone, now s/p cath,cta, med rx, now for cardiac eval, lipid review, no new sscp or sob, has some fatigue, s/p recent covid [FreeTextEntry1] : h/o of anxious, jittery, feels heart beating, no sscp, sx better with xanax

## 2024-09-25 ENCOUNTER — RX RENEWAL (OUTPATIENT)
Age: 69
End: 2024-09-25

## 2024-11-25 ENCOUNTER — RX RENEWAL (OUTPATIENT)
Age: 69
End: 2024-11-25

## 2025-01-22 ENCOUNTER — NON-APPOINTMENT (OUTPATIENT)
Age: 70
End: 2025-01-22

## 2025-01-22 ENCOUNTER — APPOINTMENT (OUTPATIENT)
Dept: CARDIOLOGY | Facility: CLINIC | Age: 70
End: 2025-01-22
Payer: MEDICARE

## 2025-01-22 VITALS
DIASTOLIC BLOOD PRESSURE: 78 MMHG | BODY MASS INDEX: 37.51 KG/M2 | OXYGEN SATURATION: 97 % | HEART RATE: 86 BPM | HEIGHT: 70 IN | WEIGHT: 262 LBS | SYSTOLIC BLOOD PRESSURE: 112 MMHG

## 2025-01-22 DIAGNOSIS — R06.09 OTHER FORMS OF DYSPNEA: ICD-10-CM

## 2025-01-22 DIAGNOSIS — I25.10 ATHEROSCLEROTIC HEART DISEASE OF NATIVE CORONARY ARTERY W/OUT ANGINA PECTORIS: ICD-10-CM

## 2025-01-22 PROCEDURE — 99214 OFFICE O/P EST MOD 30 MIN: CPT

## 2025-01-22 PROCEDURE — 93306 TTE W/DOPPLER COMPLETE: CPT

## 2025-01-22 PROCEDURE — 93000 ELECTROCARDIOGRAM COMPLETE: CPT

## 2025-01-22 RX ORDER — EZETIMIBE 10 MG/1
10 TABLET ORAL
Qty: 90 | Refills: 2 | Status: ACTIVE | COMMUNITY
Start: 2025-01-22 | End: 1900-01-01

## 2025-02-20 ENCOUNTER — RX RENEWAL (OUTPATIENT)
Age: 70
End: 2025-02-20

## 2025-05-28 ENCOUNTER — APPOINTMENT (OUTPATIENT)
Dept: CARDIOLOGY | Facility: CLINIC | Age: 70
End: 2025-05-28

## 2025-05-28 ENCOUNTER — APPOINTMENT (OUTPATIENT)
Dept: ULTRASOUND IMAGING | Facility: CLINIC | Age: 70
End: 2025-05-28

## 2025-05-28 PROCEDURE — 93880 EXTRACRANIAL BILAT STUDY: CPT

## 2025-06-15 LAB
ALBUMIN SERPL ELPH-MCNC: 4.4 G/DL
ALP BLD-CCNC: 93 U/L
ALT SERPL-CCNC: 30 U/L
ANION GAP SERPL CALC-SCNC: 13 MMOL/L
AST SERPL-CCNC: 20 U/L
BILIRUB SERPL-MCNC: 0.5 MG/DL
BUN SERPL-MCNC: 13 MG/DL
CALCIUM SERPL-MCNC: 9.5 MG/DL
CHLORIDE SERPL-SCNC: 101 MMOL/L
CHOLEST SERPL-MCNC: 111 MG/DL
CO2 SERPL-SCNC: 25 MMOL/L
CREAT SERPL-MCNC: 0.98 MG/DL
EGFRCR SERPLBLD CKD-EPI 2021: 83 ML/MIN/1.73M2
GLUCOSE SERPL-MCNC: 109 MG/DL
HDLC SERPL-MCNC: 43 MG/DL
LDLC SERPL-MCNC: 52 MG/DL
NONHDLC SERPL-MCNC: 69 MG/DL
POTASSIUM SERPL-SCNC: 5 MMOL/L
PROT SERPL-MCNC: 6.4 G/DL
SODIUM SERPL-SCNC: 139 MMOL/L
TRIGL SERPL-MCNC: 83 MG/DL

## 2025-06-23 ENCOUNTER — APPOINTMENT (OUTPATIENT)
Dept: CARDIOLOGY | Facility: CLINIC | Age: 70
End: 2025-06-23

## 2025-06-25 ENCOUNTER — APPOINTMENT (OUTPATIENT)
Dept: CARDIOLOGY | Facility: CLINIC | Age: 70
End: 2025-06-25
Payer: MEDICARE

## 2025-06-25 VITALS
HEIGHT: 70 IN | DIASTOLIC BLOOD PRESSURE: 80 MMHG | BODY MASS INDEX: 37.22 KG/M2 | WEIGHT: 260 LBS | OXYGEN SATURATION: 98 % | SYSTOLIC BLOOD PRESSURE: 120 MMHG | HEART RATE: 67 BPM

## 2025-06-25 PROCEDURE — 93000 ELECTROCARDIOGRAM COMPLETE: CPT

## 2025-06-25 PROCEDURE — 99214 OFFICE O/P EST MOD 30 MIN: CPT
